# Patient Record
Sex: FEMALE
[De-identification: names, ages, dates, MRNs, and addresses within clinical notes are randomized per-mention and may not be internally consistent; named-entity substitution may affect disease eponyms.]

---

## 2020-09-25 PROBLEM — Z00.00 ENCOUNTER FOR PREVENTIVE HEALTH EXAMINATION: Status: ACTIVE | Noted: 2020-09-25

## 2020-09-28 ENCOUNTER — TRANSCRIPTION ENCOUNTER (OUTPATIENT)
Age: 25
End: 2020-09-28

## 2020-09-28 ENCOUNTER — APPOINTMENT (OUTPATIENT)
Dept: OTOLARYNGOLOGY | Facility: CLINIC | Age: 25
End: 2020-09-28
Payer: COMMERCIAL

## 2020-09-28 VITALS
WEIGHT: 160 LBS | SYSTOLIC BLOOD PRESSURE: 120 MMHG | TEMPERATURE: 97.2 F | DIASTOLIC BLOOD PRESSURE: 90 MMHG | HEIGHT: 66 IN | BODY MASS INDEX: 25.71 KG/M2

## 2020-09-28 PROCEDURE — 99204 OFFICE O/P NEW MOD 45 MIN: CPT | Mod: 25

## 2020-09-28 PROCEDURE — 31231 NASAL ENDOSCOPY DX: CPT

## 2020-09-28 RX ORDER — FLUTICASONE PROPIONATE 50 UG/1
50 SPRAY, METERED NASAL
Qty: 48 | Refills: 3 | Status: ACTIVE | COMMUNITY
Start: 2020-09-28 | End: 1900-01-01

## 2020-09-28 RX ORDER — ETONOGESTREL AND ETHINYL ESTRADIOL .12; .015 MG/D; MG/D
INSERT, EXTENDED RELEASE VAGINAL
Refills: 0 | Status: ACTIVE | COMMUNITY

## 2020-09-28 NOTE — HISTORY OF PRESENT ILLNESS
[de-identified] : 25 y/o F with intermittent nasal congestion and notes 2-3 sinus infections per year for the past 10 years.  Does note that when nose is clear the right is still not as open as the left nare.  No sinus pain b/t infections.  Notes broke her nose and had rhinoplasty at 16 y/o and was told the ENT also shaved down some tissue on the sides.  She then had several incidents of nasal trauma playing Basketball.  Believes she broke her nose again 3-4 y/o, no imaging done at that time.  \par Not using any nasal sprays.

## 2020-09-28 NOTE — CONSULT LETTER
[Dear  ___] : Dear  [unfilled], [Consult Letter:] : I had the pleasure of evaluating your patient, [unfilled]. [Please see my note below.] : Please see my note below. [Consult Closing:] : Thank you very much for allowing me to participate in the care of this patient.  If you have any questions, please do not hesitate to contact me. [Sincerely,] : Sincerely, [FreeTextEntry2] : Rafat Drake\par [address], [place] [address]\par  [phone] [FreeTextEntry3] : Ashlee Terrell MD\par Otolaryngology and Cranial Base Surgery\par Attending Physician - Department of Otolaryngology and Head & Neck Surgery\par [facility]\par  - [employer] [facility]\par Office: [phone]\par Fax: [phone]\par

## 2020-09-28 NOTE — PROCEDURE
[FreeTextEntry6] :  Flexible scope #G7 was used. Right nasal passage with normal inferior, middle and superior turbinates. Nasal passage patent with clear middle meatus and sphenoethmoid recess. Left nasal passage, middle turbinate is lateralized vs scar tissue to lateral nasal wall. with normal inferior and superior turbinates. Nasal passage was patent with clear middle meatus and sphenoethmoid recess. No mucopurulence or polyps appreciated. Nasopharynx clear.\par \par

## 2020-09-28 NOTE — PHYSICAL EXAM
[Midline] : trachea located in midline position [Normal] : no rashes [] : septum deviated bilaterally [de-identified] : + bony irregularity b/l on palpation, right roger maneuver improves breathing

## 2020-09-28 NOTE — ASSESSMENT
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 \par - Sinus wash and flonase BID\par - CT sinus - will call with results. \par - If no improvement with medical management may need surgery; pt notes not interested in cosmetic change but feel she would benefit from facial plastic expertise for functional rhinoplasty\par - will have her F/U with Dr. Sinclair in light of previous hx of rhinoplasty and subsequent nasal trauma for possible Repair of nasal valve collapse

## 2020-10-05 ENCOUNTER — APPOINTMENT (OUTPATIENT)
Dept: CT IMAGING | Facility: IMAGING CENTER | Age: 25
End: 2020-10-05
Payer: COMMERCIAL

## 2020-10-05 ENCOUNTER — OUTPATIENT (OUTPATIENT)
Dept: OUTPATIENT SERVICES | Facility: HOSPITAL | Age: 25
LOS: 1 days | End: 2020-10-05
Payer: COMMERCIAL

## 2020-10-05 ENCOUNTER — RESULT REVIEW (OUTPATIENT)
Age: 25
End: 2020-10-05

## 2020-10-05 DIAGNOSIS — R09.81 NASAL CONGESTION: ICD-10-CM

## 2020-10-05 DIAGNOSIS — J34.2 DEVIATED NASAL SEPTUM: ICD-10-CM

## 2020-10-05 DIAGNOSIS — J32.9 CHRONIC SINUSITIS, UNSPECIFIED: ICD-10-CM

## 2020-10-05 PROCEDURE — 70486 CT MAXILLOFACIAL W/O DYE: CPT | Mod: 26

## 2020-10-05 PROCEDURE — 70486 CT MAXILLOFACIAL W/O DYE: CPT

## 2020-11-12 ENCOUNTER — APPOINTMENT (OUTPATIENT)
Dept: OTOLARYNGOLOGY | Facility: CLINIC | Age: 25
End: 2020-11-12
Payer: COMMERCIAL

## 2020-11-12 VITALS
WEIGHT: 160 LBS | HEIGHT: 66 IN | RESPIRATION RATE: 98 BRPM | BODY MASS INDEX: 25.71 KG/M2 | DIASTOLIC BLOOD PRESSURE: 70 MMHG | TEMPERATURE: 97.2 F | SYSTOLIC BLOOD PRESSURE: 120 MMHG | HEART RATE: 72 BPM

## 2020-11-12 DIAGNOSIS — J34.2 DEVIATED NASAL SEPTUM: ICD-10-CM

## 2020-11-12 DIAGNOSIS — R09.81 NASAL CONGESTION: ICD-10-CM

## 2020-11-12 DIAGNOSIS — J32.9 CHRONIC SINUSITIS, UNSPECIFIED: ICD-10-CM

## 2020-11-12 PROCEDURE — 99072 ADDL SUPL MATRL&STAF TM PHE: CPT

## 2020-11-12 PROCEDURE — 99214 OFFICE O/P EST MOD 30 MIN: CPT

## 2020-11-12 NOTE — HISTORY OF PRESENT ILLNESS
[de-identified] : Patient is a 24 year old female who presents with nasal congestion and recurrent sinus infections. She is having more trouble  breathing out of the right side of her nose. She has a history of a rhinoplasty in 2013 for a nasal fracture with Dr. Griggs. She was hit in the face in 2016 with an arm while playing basketball. She was given a prescription for flonase back in September and reports it did not relieve any of her symptoms.

## 2020-11-12 NOTE — END OF VISIT
[FreeTextEntry3] : I personally saw and examined MATIAS GARZA in detail.  I spoke to HILARY Gaspar regarding the assessment and plan of care. I performed the procedures and relevant physical exam.  I have reviewed the above assessment and plan of care and I agree.  I have made changes to the body of the note wherever necessary and appropriate.

## 2020-11-12 NOTE — ASSESSMENT
[FreeTextEntry1] : Patient is a 24 year old female who presents with chronic nasal congestion, right nare worse than left. Physical examination notable for deviated septum to the left and right nasal valve collapse (internal and external).  Unclear if she will have adequate septal cartilage for recon. \par \par Plan\par -Discussed surgical option for repair of nasal valve collapse - septal cartilage graft, poss ear cartilage, (also considering MTF cartilage), needs right  and right alar batten/strut with alar spanning suture.  Will try to do through complete endonasal approach but may need external approach. \par - no cosmetic changes planned but needs photos.\par - she will call to schedule surgery if desired - if she decides to proceed with surgery will also discuss turbinate reduction at the same time (did not get to discuss today) and schedule to come into [facility] for quick preop photos\par - - r/b/a of surgery were explained to the patient \par - d./w patient need for tape after surgery for 1 week, possibility of intranasal splint for 1 week\par - postoperative instructions were discussed with the patient including no aerobic exercise for 2 weeks, no heavy lifting for 3 weeks\par

## 2020-11-12 NOTE — REVIEW OF SYSTEMS
[As Noted in HPI] : as noted in HPI [Nasal Congestion] : nasal congestion [Sinus Pressure] : sinus pressure [Negative] : Heme/Lymph

## 2020-11-12 NOTE — CONSULT LETTER
[Dear  ___] : Dear  [unfilled], [Consult Letter:] : I had the pleasure of evaluating your patient, [unfilled]. [Please see my note below.] : Please see my note below. [Consult Closing:] : Thank you very much for allowing me to participate in the care of this patient.  If you have any questions, please do not hesitate to contact me. [FreeTextEntry3] : Sincerely, \par \par Marjorie Sinclair MD\par Otolaryngology- Facial Plastics \par [address]\[place] [address]\par (P) - [phone]\par (F) - [phone]

## 2020-11-12 NOTE — PHYSICAL EXAM
[Nasal Endoscopy Performed] : nasal endoscopy was performed, see procedure section for findings [] : septum deviated to the left [Midline] : trachea located in midline position [Normal] : no rashes [de-identified] : external nasal valve collapse right, blunting of lateral ala, right internal valve collapse.   [de-identified] : enlarged